# Patient Record
Sex: MALE | Race: WHITE | NOT HISPANIC OR LATINO | URBAN - METROPOLITAN AREA
[De-identification: names, ages, dates, MRNs, and addresses within clinical notes are randomized per-mention and may not be internally consistent; named-entity substitution may affect disease eponyms.]

---

## 2018-10-04 ENCOUNTER — EMERGENCY (EMERGENCY)
Facility: HOSPITAL | Age: 33
LOS: 0 days | Discharge: HOME | End: 2018-10-04
Admitting: EMERGENCY MEDICINE

## 2018-10-04 VITALS
RESPIRATION RATE: 16 BRPM | SYSTOLIC BLOOD PRESSURE: 160 MMHG | HEART RATE: 80 BPM | TEMPERATURE: 98 F | OXYGEN SATURATION: 100 % | DIASTOLIC BLOOD PRESSURE: 100 MMHG
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
## 2018-10-04 DIAGNOSIS — M25.521 PAIN IN RIGHT ELBOW: ICD-10-CM
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
## 2018-10-04 DIAGNOSIS — S46.911A STRAIN OF UNSPECIFIED MUSCLE, FASCIA AND TENDON AT SHOULDER AND UPPER ARM LEVEL, RIGHT ARM, INITIAL ENCOUNTER: ICD-10-CM
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
## 2018-10-04 RX ORDER — IBUPROFEN 200 MG
600 TABLET ORAL ONCE
Qty: 0 | Refills: 0 | Status: COMPLETED | OUTPATIENT
Start: 2018-10-04 | End: 2018-10-04

## 2018-10-04 RX ADMIN — Medication 600 MILLIGRAM(S): at 15:10
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
## 2018-10-04 NOTE — ED PROVIDER NOTE - NS ED ROS FT
CONST: No fever, chills or bodyaches  EYES: No pain, redness, drainage or visual changes.  ENT: No ear pain or discharge, nasal discharge or congestion. No sore throat  CARD: No chest pain, palpitations  RESP: No SOB, cough, hemoptysis. No hx of asthma or COPD  MS: +R elbow pain

## 2018-10-04 NOTE — ED PROVIDER NOTE - PHYSICAL EXAMINATION
CONST: Well appearing in NAD  EYES: PERRL, EOMI, Sclera and conjunctiva clear.   ENT: No nasal discharge. TM's clear B/L without drainage. Oropharynx normal appearing, no erythema or exudates. Uvula midline.  NECK: Non-tender, no meningeal signs  CARD: Normal S1 S2; Normal rate and rhythm  RESP: Equal BS B/L, No wheezes, rhonchi or rales. No distress  MS: Normal ROM in R elbow with tenderness over lateral epicondyle, no obvious deformity and no swelling. Full strength in R hand with no sensory deficits.

## 2018-10-04 NOTE — ED PROVIDER NOTE - DISCUSSED CLINICAL AND RADIOLOGICAL FINDINGS WITH, MDM
0715- pt to room 10 with mom. Pt c/o moist cough and SOB for 2 days. Pt agitated and crying.   
Discharge instructions provided.  Pt's mom verbalized the understanding of discharge instructions to follow up with PCP and to return to ER if condition worsens.  Maurizio CALDERÓN is being discharged with the following medication prescriptions Prelone. Pt ambulated out of ER without difficulty.   
ERP at bedside. Pt,s mom agrees with plan of care discussed by ERP. AIDET acknowledged with patient. Medicinal interventions carried out per ERP orders. X-ray performed at bedside. Respiratory at bedside. Gurney in low position, side rail up for pt safety. Call light within reach. Will continue to monitor.  
Med rec updated and complete  Allergies reviewed, per mother  Pts mother reports no antibiotics in the last 30 days.      
patient

## 2020-03-29 ENCOUNTER — TRANSCRIPTION ENCOUNTER (OUTPATIENT)
Age: 35
End: 2020-03-29

## 2020-11-06 ENCOUNTER — TRANSCRIPTION ENCOUNTER (OUTPATIENT)
Age: 35
End: 2020-11-06

## 2021-04-22 ENCOUNTER — TRANSCRIPTION ENCOUNTER (OUTPATIENT)
Age: 36
End: 2021-04-22

## 2022-04-27 ENCOUNTER — EMERGENCY (EMERGENCY)
Facility: HOSPITAL | Age: 37
LOS: 0 days | Discharge: HOME | End: 2022-04-27
Attending: EMERGENCY MEDICINE | Admitting: EMERGENCY MEDICINE
Payer: COMMERCIAL

## 2022-04-27 ENCOUNTER — TRANSCRIPTION ENCOUNTER (OUTPATIENT)
Age: 37
End: 2022-04-27

## 2022-04-27 VITALS
TEMPERATURE: 98 F | OXYGEN SATURATION: 98 % | SYSTOLIC BLOOD PRESSURE: 187 MMHG | DIASTOLIC BLOOD PRESSURE: 92 MMHG | HEART RATE: 71 BPM | WEIGHT: 225.09 LBS | RESPIRATION RATE: 16 BRPM

## 2022-04-27 VITALS
HEART RATE: 83 BPM | DIASTOLIC BLOOD PRESSURE: 90 MMHG | RESPIRATION RATE: 16 BRPM | OXYGEN SATURATION: 99 % | SYSTOLIC BLOOD PRESSURE: 150 MMHG

## 2022-04-27 DIAGNOSIS — R22.1 LOCALIZED SWELLING, MASS AND LUMP, NECK: ICD-10-CM

## 2022-04-27 DIAGNOSIS — E07.89 OTHER SPECIFIED DISORDERS OF THYROID: ICD-10-CM

## 2022-04-27 LAB
ALBUMIN SERPL ELPH-MCNC: 5 G/DL — SIGNIFICANT CHANGE UP (ref 3.5–5.2)
ALP SERPL-CCNC: 94 U/L — SIGNIFICANT CHANGE UP (ref 30–115)
ALT FLD-CCNC: 31 U/L — SIGNIFICANT CHANGE UP (ref 0–41)
ANION GAP SERPL CALC-SCNC: 13 MMOL/L — SIGNIFICANT CHANGE UP (ref 7–14)
AST SERPL-CCNC: 25 U/L — SIGNIFICANT CHANGE UP (ref 0–41)
BASOPHILS # BLD AUTO: 0.05 K/UL — SIGNIFICANT CHANGE UP (ref 0–0.2)
BASOPHILS NFR BLD AUTO: 0.7 % — SIGNIFICANT CHANGE UP (ref 0–1)
BILIRUB SERPL-MCNC: 0.3 MG/DL — SIGNIFICANT CHANGE UP (ref 0.2–1.2)
BUN SERPL-MCNC: 16 MG/DL — SIGNIFICANT CHANGE UP (ref 10–20)
CALCIUM SERPL-MCNC: 9.8 MG/DL — SIGNIFICANT CHANGE UP (ref 8.5–10.1)
CHLORIDE SERPL-SCNC: 102 MMOL/L — SIGNIFICANT CHANGE UP (ref 98–110)
CO2 SERPL-SCNC: 26 MMOL/L — SIGNIFICANT CHANGE UP (ref 17–32)
CREAT SERPL-MCNC: 1 MG/DL — SIGNIFICANT CHANGE UP (ref 0.7–1.5)
EGFR: 98 ML/MIN/1.73M2 — SIGNIFICANT CHANGE UP
EOSINOPHIL # BLD AUTO: 0.13 K/UL — SIGNIFICANT CHANGE UP (ref 0–0.7)
EOSINOPHIL NFR BLD AUTO: 1.7 % — SIGNIFICANT CHANGE UP (ref 0–8)
GLUCOSE SERPL-MCNC: 96 MG/DL — SIGNIFICANT CHANGE UP (ref 70–99)
HCT VFR BLD CALC: 47.3 % — SIGNIFICANT CHANGE UP (ref 42–52)
HGB BLD-MCNC: 15.6 G/DL — SIGNIFICANT CHANGE UP (ref 14–18)
IMM GRANULOCYTES NFR BLD AUTO: 0.3 % — SIGNIFICANT CHANGE UP (ref 0.1–0.3)
LYMPHOCYTES # BLD AUTO: 2.18 K/UL — SIGNIFICANT CHANGE UP (ref 1.2–3.4)
LYMPHOCYTES # BLD AUTO: 28.6 % — SIGNIFICANT CHANGE UP (ref 20.5–51.1)
MCHC RBC-ENTMCNC: 28.8 PG — SIGNIFICANT CHANGE UP (ref 27–31)
MCHC RBC-ENTMCNC: 33 G/DL — SIGNIFICANT CHANGE UP (ref 32–37)
MCV RBC AUTO: 87.4 FL — SIGNIFICANT CHANGE UP (ref 80–94)
MONOCYTES # BLD AUTO: 0.73 K/UL — HIGH (ref 0.1–0.6)
MONOCYTES NFR BLD AUTO: 9.6 % — HIGH (ref 1.7–9.3)
NEUTROPHILS # BLD AUTO: 4.5 K/UL — SIGNIFICANT CHANGE UP (ref 1.4–6.5)
NEUTROPHILS NFR BLD AUTO: 59.1 % — SIGNIFICANT CHANGE UP (ref 42.2–75.2)
NRBC # BLD: 0 /100 WBCS — SIGNIFICANT CHANGE UP (ref 0–0)
PLATELET # BLD AUTO: 245 K/UL — SIGNIFICANT CHANGE UP (ref 130–400)
POTASSIUM SERPL-MCNC: 4.5 MMOL/L — SIGNIFICANT CHANGE UP (ref 3.5–5)
POTASSIUM SERPL-SCNC: 4.5 MMOL/L — SIGNIFICANT CHANGE UP (ref 3.5–5)
PROT SERPL-MCNC: 8.2 G/DL — HIGH (ref 6–8)
RBC # BLD: 5.41 M/UL — SIGNIFICANT CHANGE UP (ref 4.7–6.1)
RBC # FLD: 12.5 % — SIGNIFICANT CHANGE UP (ref 11.5–14.5)
SODIUM SERPL-SCNC: 141 MMOL/L — SIGNIFICANT CHANGE UP (ref 135–146)
WBC # BLD: 7.61 K/UL — SIGNIFICANT CHANGE UP (ref 4.8–10.8)
WBC # FLD AUTO: 7.61 K/UL — SIGNIFICANT CHANGE UP (ref 4.8–10.8)

## 2022-04-27 PROCEDURE — 99285 EMERGENCY DEPT VISIT HI MDM: CPT

## 2022-04-27 PROCEDURE — 71260 CT THORAX DX C+: CPT | Mod: 26,MA

## 2022-04-27 PROCEDURE — 70491 CT SOFT TISSUE NECK W/DYE: CPT | Mod: 26,MA

## 2022-04-27 RX ORDER — SODIUM CHLORIDE 9 MG/ML
1000 INJECTION, SOLUTION INTRAVENOUS ONCE
Refills: 0 | Status: COMPLETED | OUTPATIENT
Start: 2022-04-27 | End: 2022-04-27

## 2022-04-27 RX ADMIN — SODIUM CHLORIDE 1000 MILLILITER(S): 9 INJECTION, SOLUTION INTRAVENOUS at 19:54

## 2022-04-27 NOTE — ED PROVIDER NOTE - CARE PROVIDER_API CALL
Juju Salcedo  INTERNAL MEDICINE  [address] [place] [address]  Phone: [phone]  Fax: [phone]  Follow Up Time: 1-3 Days

## 2022-04-27 NOTE — ED PROVIDER NOTE - OBJECTIVE STATEMENT
37 y.o. male without any PMH presented to the ER c/o Right neck mass since this AM.  Pt denies trauma, fever, chills, sore throat, dysphagia, SOB. No other complaints.

## 2022-04-27 NOTE — ED ADULT NURSE NOTE - IS THE PATIENT ABLE TO BE SCREENED?
Per physician instructions      If you have questions or concerns on any instructions given to you by your provider today or if you need to schedule an appointment, you can reach us at [phone].                 
Yes
Add 16650 Cpt? (Important Note: In 2017 The Use Of 49463 Is Being Tracked By Cms To Determine Future Global Period Reimbursement For Global Periods): no
Detail Level: Detailed

## 2022-04-27 NOTE — ED PROVIDER NOTE - NSFOLLOWUPINSTRUCTIONS_ED_ALL_ED_FT
Thyroid Nodules    WHAT YOU NEED TO KNOW:    Thyroid nodules are growths on your thyroid gland. Your thyroid makes hormones that help control your body temperature, heart rate, and growth. The hormones also control how fast your body uses food for energy. Some nodules are lumps of tissue, and others are filled with fluid.     DISCHARGE INSTRUCTIONS:    Medicines:   •Thyroid medicine is given to bring your thyroid hormone levels back to a normal range.       •Radioactive iodine is given to damage cells in your thyroid gland and decrease the size of your nodules.       •Take your medicine as directed. Contact your healthcare provider if you think your medicine is not helping or if you have side effects. Tell him or her if you are allergic to any medicine. Keep a list of the medicines, vitamins, and herbs you take. Include the amounts, and when and why you take them. Bring the list or the pill bottles to follow-up visits. Carry your medicine list with you in case of an emergency.      Follow up with your healthcare provider as directed: You may need frequent blood tests to check your thyroid hormone levels. You may also need tests such as an ultrasound to check if any nodules are growing or have returned. If you had surgery, follow directions about how to care for your wound. Write down your questions so you remember to ask them during your visits.    Eat iodine-rich foods: Examples include fish, seaweed, dairy products, eggs, beans, and lean meat. Iodized salt also contains iodine. You may need to use iodized table salt when you cook and season your food. Iodine may be added to bread or to your drinking water. Ask for a list of foods that contain iodine, and ask how much iodine you need each day.    Contact your healthcare provider if:   •You have a new cough that does not improve.      •You begin choking or have new or increased trouble swallowing.       •Your voice becomes hoarse.      •You are losing weight without trying.      •You have questions or concerns about your condition or care.      Seek immediate care or call 911 if:   •You have redness, swelling, or drainage at your surgery site.      •You have sudden chest pain or trouble breathing.      •Your symptoms worsen, even after you take your medicines.         © Copyright [employer] 2022           back to top                          © Copyright [employer] 2022

## 2022-04-27 NOTE — ED PROVIDER NOTE - PATIENT PORTAL LINK FT
You can access the FollowMyHealth Patient Portal offered by [facility] by registering at the following website: [email or web address]. By joining [facility]’s FollowMyHealth portal, you will also be able to view your health information using other applications (apps) compatible with our system.

## 2022-04-27 NOTE — ED PROVIDER NOTE - NS ED ATTENDING STATEMENT MOD
This was a shared visit with the CORINNE. I reviewed and verified the documentation and independently performed the documented:

## 2022-04-27 NOTE — ED PROVIDER NOTE - CLINICAL SUMMARY MEDICAL DECISION MAKING FREE TEXT BOX
37-year-old male presents with right neck mass and found to have mass in right thyroid lobe without airway compromise.   Labs reassuring.  Imaging concerning for mass in the right lobe of the thyroid, which appears to correlate with the bulge of patient's right neck.  Results discussed with the patient along with recommendation for urgent endocrinology follow-up for likely biopsy and further care.  Patient amenable to outpatient follow-up at this time.  Okay to DC with supportive care return precautions.

## 2022-04-27 NOTE — ED PROVIDER NOTE - ENMT, MLM
Airway patent, Nasal mucosa clear. Mouth with normal mucosa. Throat has no vesicles, no oropharyngeal exudates and uvula is midline. (+) Large Soft Right sided thyroid mass, no lymphadenopathy

## 2022-05-31 ENCOUNTER — RESULT REVIEW (OUTPATIENT)
Age: 37
End: 2022-05-31

## 2022-07-05 PROBLEM — Z00.00 ENCOUNTER FOR PREVENTIVE HEALTH EXAMINATION: Status: ACTIVE | Noted: 2022-07-05

## 2022-07-06 ENCOUNTER — APPOINTMENT (OUTPATIENT)
Dept: OTOLARYNGOLOGY | Facility: CLINIC | Age: 37
End: 2022-07-06

## 2022-07-06 PROCEDURE — 99243 OFF/OP CNSLTJ NEW/EST LOW 30: CPT

## 2022-07-06 NOTE — PHYSICAL EXAM
[de-identified] : 5cm soft right thyroid nodule mobile on deglutition [Midline] : trachea located in midline position [Normal] : no rashes

## 2022-07-06 NOTE — REASON FOR VISIT
[Initial Consultation] : an initial consultation for [FreeTextEntry2] : Referred by Dr Etienne with large right thyroid nodule

## 2022-07-06 NOTE — ASSESSMENT
[FreeTextEntry1] : I have discussed the clinical implications of my findings with  the patient. At this time the patient wishes to proceed with surgery- right cari-  thyroidectomy  .  The goals of the procedure, operative plan, alternatives including nonsurgical treatment , total thyroidectomy,  and risks which include but are not limited to  anesthetic risk, bleeding, infection,  aesthetic deformity, numbness of skin,  chronic swallowing and voice problems, recurrent laryngeal nerve injury, superior laryngeal nerve injury, need for further surgery, need for further treatment, permanent hypoparathyroidism, need for thyroid homrnone , chyle fistula were all explained to the patient who appears to understand  and wishes to proceed.    All questions are answered.  Accordingly they will be scheduled for right cair- thyroidectomy The patient will follow up with me sooner PRN any new, persistent or worsening symptoms\par aprox 45 mi  spent counselimg and coordinating care

## 2022-07-06 NOTE — HISTORY OF PRESENT ILLNESS
[de-identified] : presents at request of Dr. Etiennewith complaints of  large 5cm right  thyroid nodule for > 6 months  .\par He TUS guided fna  \par Denies FH thyroid cancer, RT exposure, hoarseness but + mild dysphagia\par \par Barbara Dill's

## 2022-08-02 ENCOUNTER — OUTPATIENT (OUTPATIENT)
Dept: OUTPATIENT SERVICES | Facility: HOSPITAL | Age: 37
LOS: 1 days | Discharge: HOME | End: 2022-08-02

## 2022-08-02 ENCOUNTER — RESULT REVIEW (OUTPATIENT)
Age: 37
End: 2022-08-02

## 2022-08-02 VITALS
HEIGHT: 73 IN | RESPIRATION RATE: 18 BRPM | HEART RATE: 80 BPM | DIASTOLIC BLOOD PRESSURE: 84 MMHG | SYSTOLIC BLOOD PRESSURE: 147 MMHG | TEMPERATURE: 99 F | WEIGHT: 233.69 LBS | OXYGEN SATURATION: 100 %

## 2022-08-02 DIAGNOSIS — E04.1 NONTOXIC SINGLE THYROID NODULE: ICD-10-CM

## 2022-08-02 DIAGNOSIS — Z01.818 ENCOUNTER FOR OTHER PREPROCEDURAL EXAMINATION: ICD-10-CM

## 2022-08-02 DIAGNOSIS — Z78.9 OTHER SPECIFIED HEALTH STATUS: Chronic | ICD-10-CM

## 2022-08-02 LAB
ALBUMIN SERPL ELPH-MCNC: 5 G/DL — SIGNIFICANT CHANGE UP (ref 3.5–5.2)
ALP SERPL-CCNC: 97 U/L — SIGNIFICANT CHANGE UP (ref 30–115)
ALT FLD-CCNC: 32 U/L — SIGNIFICANT CHANGE UP (ref 0–41)
ANION GAP SERPL CALC-SCNC: 11 MMOL/L — SIGNIFICANT CHANGE UP (ref 7–14)
APTT BLD: 34.8 SEC — SIGNIFICANT CHANGE UP (ref 27–39.2)
AST SERPL-CCNC: 24 U/L — SIGNIFICANT CHANGE UP (ref 0–41)
BASOPHILS # BLD AUTO: 0.04 K/UL — SIGNIFICANT CHANGE UP (ref 0–0.2)
BASOPHILS NFR BLD AUTO: 0.6 % — SIGNIFICANT CHANGE UP (ref 0–1)
BILIRUB SERPL-MCNC: 0.3 MG/DL — SIGNIFICANT CHANGE UP (ref 0.2–1.2)
BLD GP AB SCN SERPL QL: SIGNIFICANT CHANGE UP
BUN SERPL-MCNC: 17 MG/DL — SIGNIFICANT CHANGE UP (ref 10–20)
CALCIUM SERPL-MCNC: 9.4 MG/DL — SIGNIFICANT CHANGE UP (ref 8.5–10.1)
CHLORIDE SERPL-SCNC: 99 MMOL/L — SIGNIFICANT CHANGE UP (ref 98–110)
CO2 SERPL-SCNC: 26 MMOL/L — SIGNIFICANT CHANGE UP (ref 17–32)
CREAT SERPL-MCNC: 1.2 MG/DL — SIGNIFICANT CHANGE UP (ref 0.7–1.5)
EGFR: 79 ML/MIN/1.73M2 — SIGNIFICANT CHANGE UP
EOSINOPHIL # BLD AUTO: 0.07 K/UL — SIGNIFICANT CHANGE UP (ref 0–0.7)
EOSINOPHIL NFR BLD AUTO: 1 % — SIGNIFICANT CHANGE UP (ref 0–8)
GLUCOSE SERPL-MCNC: 95 MG/DL — SIGNIFICANT CHANGE UP (ref 70–99)
HCT VFR BLD CALC: 46.8 % — SIGNIFICANT CHANGE UP (ref 42–52)
HGB BLD-MCNC: 15.7 G/DL — SIGNIFICANT CHANGE UP (ref 14–18)
IMM GRANULOCYTES NFR BLD AUTO: 0.3 % — SIGNIFICANT CHANGE UP (ref 0.1–0.3)
INR BLD: 1.06 RATIO — SIGNIFICANT CHANGE UP (ref 0.65–1.3)
LYMPHOCYTES # BLD AUTO: 1.97 K/UL — SIGNIFICANT CHANGE UP (ref 1.2–3.4)
LYMPHOCYTES # BLD AUTO: 28.4 % — SIGNIFICANT CHANGE UP (ref 20.5–51.1)
MCHC RBC-ENTMCNC: 29.7 PG — SIGNIFICANT CHANGE UP (ref 27–31)
MCHC RBC-ENTMCNC: 33.5 G/DL — SIGNIFICANT CHANGE UP (ref 32–37)
MCV RBC AUTO: 88.5 FL — SIGNIFICANT CHANGE UP (ref 80–94)
MONOCYTES # BLD AUTO: 0.64 K/UL — HIGH (ref 0.1–0.6)
MONOCYTES NFR BLD AUTO: 9.2 % — SIGNIFICANT CHANGE UP (ref 1.7–9.3)
NEUTROPHILS # BLD AUTO: 4.19 K/UL — SIGNIFICANT CHANGE UP (ref 1.4–6.5)
NEUTROPHILS NFR BLD AUTO: 60.5 % — SIGNIFICANT CHANGE UP (ref 42.2–75.2)
NRBC # BLD: 0 /100 WBCS — SIGNIFICANT CHANGE UP (ref 0–0)
PLATELET # BLD AUTO: 223 K/UL — SIGNIFICANT CHANGE UP (ref 130–400)
POTASSIUM SERPL-MCNC: 3.9 MMOL/L — SIGNIFICANT CHANGE UP (ref 3.5–5)
POTASSIUM SERPL-SCNC: 3.9 MMOL/L — SIGNIFICANT CHANGE UP (ref 3.5–5)
PROT SERPL-MCNC: 7.6 G/DL — SIGNIFICANT CHANGE UP (ref 6–8)
PROTHROM AB SERPL-ACNC: 12.2 SEC — SIGNIFICANT CHANGE UP (ref 9.95–12.87)
RBC # BLD: 5.29 M/UL — SIGNIFICANT CHANGE UP (ref 4.7–6.1)
RBC # FLD: 12.6 % — SIGNIFICANT CHANGE UP (ref 11.5–14.5)
SODIUM SERPL-SCNC: 136 MMOL/L — SIGNIFICANT CHANGE UP (ref 135–146)
WBC # BLD: 6.93 K/UL — SIGNIFICANT CHANGE UP (ref 4.8–10.8)
WBC # FLD AUTO: 6.93 K/UL — SIGNIFICANT CHANGE UP (ref 4.8–10.8)

## 2022-08-02 PROCEDURE — 71046 X-RAY EXAM CHEST 2 VIEWS: CPT | Mod: 26

## 2022-08-02 PROCEDURE — 93010 ELECTROCARDIOGRAM REPORT: CPT

## 2022-08-02 NOTE — H&P PST ADULT - HISTORY OF PRESENT ILLNESS
Patient is a  37 year old male presenting to PAST in preparation for RIGHT THYROID LOBECTOMY on 8/23  under gen anesthesia by Dr. Santamaria  Reports he presented to urgent care after he noted that the right side of his neck was enlarged, a sonogram was done and it was determined that he had a large mass  aprox 5cm. He was sent to Ed and was advised to f/u with endocrinologist. He saw dr Salcedo who advised him to have above  Reports that the mass is benign  PATIENT CURRENTLY DENIES CHEST PAIN  SHORTNESS OF BREATH  PALPITATIONS,  DYSURIA, OR UPPER RESPIRATORY INFECTION IN PAST 2 WEEKS  EXERCISE  TOLERANCE  1-2 FLIGHT OF STAIRS  WITHOUT SHORTNESS OF BREATH    Anesthesia Alert  NO--Difficult Airway  NO--History of neck surgery or radiation  NO--Limited ROM of neck  NO--History of Malignant hyperthermia  NO--Personal or family history of Pseudocholinesterase deficiency  NO--Prior Anesthesia Complication  NO--Latex Allergy  NO--Loose teeth  NO--History of Rheumatoid Arthritis  NO--TIAGO  NO-- BLEEDING RISK  NO--Other_____    As per patient, this is their complete medical and surgical history, including medications both prescribed or over the counter.  Patient verbalized understanding of instructions and was given the opportunity to ask questions and have them answered.  Patient denies any signs or symptoms of COVID 19 and denies contact with known positive individuals.  They have an appointment for repeat COVID testing pre-procedure and acknowledge its time and place.  They were instructed to quarantine pre-procedure, practice exposure control measures, continue to self-monitor and report any concerns to their proceduralist.

## 2022-08-20 ENCOUNTER — LABORATORY RESULT (OUTPATIENT)
Age: 37
End: 2022-08-20

## 2022-08-22 NOTE — ASU PATIENT PROFILE, ADULT - FALL HARM RISK - RISK INTERVENTIONS

## 2022-08-22 NOTE — ASU PATIENT PROFILE, ADULT - NSICDXPASTMEDICALHX_GEN_ALL_CORE_FT
PAST MEDICAL HISTORY:  GERD (gastroesophageal reflux disease)     Thyroid mass      Kaley Thurman)  2017 13:38:12

## 2022-08-23 ENCOUNTER — RESULT REVIEW (OUTPATIENT)
Age: 37
End: 2022-08-23

## 2022-08-23 ENCOUNTER — OUTPATIENT (OUTPATIENT)
Dept: OUTPATIENT SERVICES | Facility: HOSPITAL | Age: 37
LOS: 1 days | Discharge: HOME | End: 2022-08-23

## 2022-08-23 ENCOUNTER — APPOINTMENT (OUTPATIENT)
Dept: OTOLARYNGOLOGY | Facility: HOSPITAL | Age: 37
End: 2022-08-23

## 2022-08-23 ENCOUNTER — TRANSCRIPTION ENCOUNTER (OUTPATIENT)
Age: 37
End: 2022-08-23

## 2022-08-23 VITALS
WEIGHT: 225.09 LBS | SYSTOLIC BLOOD PRESSURE: 156 MMHG | RESPIRATION RATE: 15 BRPM | HEART RATE: 72 BPM | OXYGEN SATURATION: 99 % | TEMPERATURE: 98 F | HEIGHT: 73 IN | DIASTOLIC BLOOD PRESSURE: 92 MMHG

## 2022-08-23 VITALS
DIASTOLIC BLOOD PRESSURE: 79 MMHG | HEART RATE: 56 BPM | TEMPERATURE: 98 F | OXYGEN SATURATION: 99 % | SYSTOLIC BLOOD PRESSURE: 133 MMHG

## 2022-08-23 DIAGNOSIS — Z78.9 OTHER SPECIFIED HEALTH STATUS: Chronic | ICD-10-CM

## 2022-08-23 LAB — ABO RH CONFIRMATION: SIGNIFICANT CHANGE UP

## 2022-08-23 PROCEDURE — 88307 TISSUE EXAM BY PATHOLOGIST: CPT | Mod: 26

## 2022-08-23 PROCEDURE — 60220 PARTIAL REMOVAL OF THYROID: CPT

## 2022-08-23 RX ORDER — ACETAMINOPHEN 500 MG
1000 TABLET ORAL ONCE
Refills: 0 | Status: DISCONTINUED | OUTPATIENT
Start: 2022-08-23 | End: 2022-08-23

## 2022-08-23 RX ORDER — OXYCODONE HYDROCHLORIDE 5 MG/1
5 TABLET ORAL ONCE
Refills: 0 | Status: DISCONTINUED | OUTPATIENT
Start: 2022-08-23 | End: 2022-08-24

## 2022-08-23 RX ORDER — HYDROMORPHONE HYDROCHLORIDE 2 MG/ML
0.2 INJECTION INTRAMUSCULAR; INTRAVENOUS; SUBCUTANEOUS
Refills: 0 | Status: DISCONTINUED | OUTPATIENT
Start: 2022-08-23 | End: 2022-08-24

## 2022-08-23 RX ORDER — OXYCODONE HYDROCHLORIDE 5 MG/1
10 TABLET ORAL ONCE
Refills: 0 | Status: DISCONTINUED | OUTPATIENT
Start: 2022-08-23 | End: 2022-08-24

## 2022-08-23 RX ORDER — ACETYLCYSTEINE 200 MG/ML
15 VIAL (ML) MISCELLANEOUS ONCE
Refills: 0 | Status: DISCONTINUED | OUTPATIENT
Start: 2022-08-23 | End: 2022-08-23

## 2022-08-23 RX ORDER — HYDROMORPHONE HYDROCHLORIDE 2 MG/ML
0.5 INJECTION INTRAMUSCULAR; INTRAVENOUS; SUBCUTANEOUS
Refills: 0 | Status: DISCONTINUED | OUTPATIENT
Start: 2022-08-23 | End: 2022-08-24

## 2022-08-23 RX ORDER — ACETAMINOPHEN WITH CODEINE 300MG-30MG
1 TABLET ORAL
Qty: 20 | Refills: 0
Start: 2022-08-23 | End: 2022-08-27

## 2022-08-23 NOTE — ASU DISCHARGE PLAN (ADULT/PEDIATRIC) - NS MD DC FALL RISK RISK
For information on Fall & Injury Prevention, visit: [email or web address] OR  [email or web address] OR  https://www.cdc.gov/steadi/patient.html

## 2022-08-23 NOTE — PRE-ANESTHESIA EVALUATION ADULT - NSANTHOSAYNRD_GEN_A_CORE
denies/No. TIAGO screening performed.  STOP BANG Legend: 0-2 = LOW Risk; 3-4 = INTERMEDIATE Risk; 5-8 = HIGH Risk

## 2022-08-23 NOTE — BRIEF OPERATIVE NOTE - CONDITION POST OP
Patient called the endocrine department,New patient appointment on 5/31/17 with Dr. Lr.  The patient c/o lightheadedness x 1 week,bilateral hand numbness x 3 days,vision becomes \"patchy\" when turning head fast and fatigue.  Dr. Price referred patient to the endocrine department.  Writer transferred patient to Dr. Price office regarding several symptoms then suggested she call her pcp.  
stable

## 2022-08-23 NOTE — ASU DISCHARGE PLAN (ADULT/PEDIATRIC) - ASU DC SPECIAL INSTRUCTIONSFT
PAIN: You may take Tylenol 650mg every 6-8 hours as needed for mild to moderate pain. You may take Tylenol #3 every 6-8 hours as needed for severe pain. Do not take this medication and drive or make important decisions.  DRESSING: Keep the dressing clean and dry for 24 hours post-operatively. You may shower normally after that. Do not take the steri strips off, they will fall off by themselves. No soaking (ocean, pool, bath, etc.) until cleared by Dr. Santamaria.  FOLLOW UP: In two weeks; call to schedule an appointment.

## 2022-08-23 NOTE — ASU DISCHARGE PLAN (ADULT/PEDIATRIC) - CARE PROVIDER_API CALL
Denis Santamaria)  Otolaryngology  [address] [place] [address], [address] [place] [address]  Phone: [phone]  Fax: [phone]  Follow Up Time: 2 weeks

## 2022-08-23 NOTE — CHART NOTE - NSCHARTNOTEFT_GEN_A_CORE
PACU ANESTHESIA ADMISSION NOTE      Procedure: Right thyroid lobectomy      Post op diagnosis:  Right thyroid nodule        ____  Intubated  TV:______       Rate: ______      FiO2: ______    _x__  Patent Airway    _x___  Full return of protective reflexes    _x___  Full recovery from anesthesia / back to baseline     Vitals:   T:    97.6       R:        12          BP:       128/73           Sat:      96             P: 85      Mental Status:  __x__ Awake   _____ Alert   _____ Drowsy   _____ Sedated    Nausea/Vomiting:  ____x NO  ______Yes,   See Post - Op Orders          Pain Scale (0-10):  _____    Treatment: ____ None    ___x_ See Post - Op/PCA Orders    Post - Operative Fluids:   ____ Oral   __x__ See Post - Op Orders    Plan: Discharge:   __x__Home       _____Floor     _____Critical Care    _____  Other:_________________    Comments: Pt alert and oriented, procedure completed without anesthetic complication, no c/o pain/discomfort at this time.

## 2022-08-25 DIAGNOSIS — E04.1 NONTOXIC SINGLE THYROID NODULE: ICD-10-CM

## 2022-08-26 LAB — SURGICAL PATHOLOGY STUDY: SIGNIFICANT CHANGE UP

## 2022-08-30 DIAGNOSIS — D49.7 NEOPLASM OF UNSPECIFIED BEHAVIOR OF ENDOCRINE GLANDS AND OTHER PARTS OF NERVOUS SYSTEM: ICD-10-CM

## 2022-09-07 ENCOUNTER — APPOINTMENT (OUTPATIENT)
Dept: OTOLARYNGOLOGY | Facility: CLINIC | Age: 37
End: 2022-09-07

## 2022-09-07 PROCEDURE — 99024 POSTOP FOLLOW-UP VISIT: CPT

## 2022-09-07 NOTE — REASON FOR VISIT
[Subsequent Evaluation] : a subsequent evaluation for [FreeTextEntry2] : s/p hemithyroidectomy path NIFTP

## 2022-09-07 NOTE — ASSESSMENT
[FreeTextEntry1] : s/p hemithyroidectomy path NIFTP\par options d/w patiemnt \par will observe\par f/u w Dr Etienne/endo

## 2022-09-07 NOTE — PHYSICAL EXAM
[de-identified] : incison c/d/i suture removed [Midline] : trachea located in midline position [Normal] : no rashes

## 2022-09-08 ENCOUNTER — TRANSCRIPTION ENCOUNTER (OUTPATIENT)
Age: 37
End: 2022-09-08

## 2023-04-07 NOTE — ED PROVIDER NOTE - ATTENDING APP SHARED VISIT CONTRIBUTION OF CARE
37-year-old male otherwise healthy presents with newly identified right neck mass without airway compromise.  Patient hemodynamically stable and otherwise well-appearing. Lip Wedge Excision Repair Text: Given the location of the defect and the proximity to free margins a full thickness wedge repair was deemed most appropriate.  Using a sterile surgical marker, the appropriate repair was drawn incorporating the defect and placing the expected incisions perpendicular to the vermilion border.  The vermilion border was also meticulously outlined to ensure appropriate reapproximation during the repair.  The area thus outlined was incised through and through with a #15 scalpel blade.  The muscularis and dermis were reaproximated with deep sutures following hemostasis. Care was taken to realign the vermilion border before proceeding with the superficial closure.  Once the vermilion was realigned the superfical and mucosal closure was finished.

## 2023-06-06 PROBLEM — E07.9 DISORDER OF THYROID, UNSPECIFIED: Chronic | Status: ACTIVE | Noted: 2022-08-02

## 2023-06-06 PROBLEM — K21.9 GASTRO-ESOPHAGEAL REFLUX DISEASE WITHOUT ESOPHAGITIS: Chronic | Status: ACTIVE | Noted: 2022-08-02

## 2023-07-21 ENCOUNTER — APPOINTMENT (OUTPATIENT)
Dept: ENDOCRINOLOGY | Facility: CLINIC | Age: 38
End: 2023-07-21
Payer: COMMERCIAL

## 2023-07-21 VITALS
DIASTOLIC BLOOD PRESSURE: 88 MMHG | WEIGHT: 225 LBS | HEART RATE: 75 BPM | BODY MASS INDEX: 29.82 KG/M2 | HEIGHT: 73 IN | OXYGEN SATURATION: 98 % | TEMPERATURE: 97.4 F | SYSTOLIC BLOOD PRESSURE: 128 MMHG

## 2023-07-21 DIAGNOSIS — Z80.42 FAMILY HISTORY OF MALIGNANT NEOPLASM OF PROSTATE: ICD-10-CM

## 2023-07-21 PROCEDURE — 99203 OFFICE O/P NEW LOW 30 MIN: CPT

## 2023-07-21 NOTE — HISTORY OF PRESENT ILLNESS
[FreeTextEntry1] : 38 year old male patient who present for evaluation of subclinical hypothyroidism , he is s/p right thyroidectomy for NIFTP \par \par \par \par # thyroid history \par - in 5/2022 he was noted to have right side mass, had evaluation done followed by right thyroidectomy and isthmectomy \par - Pathology ( 8/2023 ) showed NIFTP , patient did not follow up with endocrinology since\par - he feels ok , energy level ok , no constipation, no bloating, cold intolerance he is not on any medications\par

## 2023-07-21 NOTE — PHYSICAL EXAM
[Alert] : alert [Well Nourished] : well nourished [No Acute Distress] : no acute distress [No Proptosis] : no proptosis [No Lid Lag] : no lid lag [Well Healed Scar] : well healed scar [No Respiratory Distress] : no respiratory distress [No Accessory Muscle Use] : no accessory muscle use [Clear to Auscultation] : lungs were clear to auscultation bilaterally [Normal S1, S2] : normal S1 and S2 [No Murmurs] : no murmurs [Regular Rhythm] : with a regular rhythm [No Edema] : no peripheral edema [No Stigmata of Cushings Syndrome] : no stigmata of Cushings Syndrome [Oriented x3] : oriented to person, place, and time [de-identified] : left thyroid tissue

## 2023-07-21 NOTE — ASSESSMENT
[FreeTextEntry1] : 38 year old male patient who present for evaluation of subclinical hypothyroidism , he is s/p right thyroidectomy for NIFTP ( 8/2022) \par \par \par \par #Subclinical hypothyroidism , DL, preDM \par - in 5/2022 he was noted to have right side mass, had evaluation done followed by right thyroidectomy and isthmectomy \par - Pathology ( 8/2023 ) showed NIFTP , patient did not follow up with endocrinology since\par - he feels ok , energy level ok , no constipation, no bloating, cold intolerance he is not on any medications\par - holding off on meds for now , will recheck levels if hypothyroidism / worsening LDL will start Lt4 \par - discussed low carbs and low fat diet

## 2023-07-21 NOTE — DATA REVIEWED
[FreeTextEntry1] : 5/27/23 : A1c 5.9%  ldl 155 tg 147 tsh 5.94  ft4 1 GFR 78  \par \par \par \par \par Pathology ( 8/2022)\par right thyroid lobe pathology ( 8/2022) , focally invade capsule but doesn't go through capsule all margins negative \par NIFTP  4 x 3.5x 2.5 cm

## 2023-11-15 ENCOUNTER — APPOINTMENT (OUTPATIENT)
Dept: ENDOCRINOLOGY | Facility: CLINIC | Age: 38
End: 2023-11-15
Payer: COMMERCIAL

## 2023-11-15 VITALS
HEART RATE: 65 BPM | SYSTOLIC BLOOD PRESSURE: 120 MMHG | DIASTOLIC BLOOD PRESSURE: 80 MMHG | OXYGEN SATURATION: 98 % | BODY MASS INDEX: 29.42 KG/M2 | WEIGHT: 222 LBS | HEIGHT: 73 IN

## 2023-11-15 DIAGNOSIS — R73.03 PREDIABETES.: ICD-10-CM

## 2023-11-15 PROCEDURE — 99212 OFFICE O/P EST SF 10 MIN: CPT

## 2023-12-06 ENCOUNTER — APPOINTMENT (OUTPATIENT)
Dept: UROLOGY | Facility: CLINIC | Age: 38
End: 2023-12-06
Payer: COMMERCIAL

## 2023-12-06 DIAGNOSIS — Z12.5 ENCOUNTER FOR SCREENING FOR MALIGNANT NEOPLASM OF PROSTATE: ICD-10-CM

## 2023-12-06 PROCEDURE — 99203 OFFICE O/P NEW LOW 30 MIN: CPT

## 2024-01-09 ENCOUNTER — OUTPATIENT (OUTPATIENT)
Dept: OUTPATIENT SERVICES | Facility: HOSPITAL | Age: 39
LOS: 1 days | End: 2024-01-09
Payer: COMMERCIAL

## 2024-01-09 ENCOUNTER — RESULT REVIEW (OUTPATIENT)
Age: 39
End: 2024-01-09

## 2024-01-09 DIAGNOSIS — Z00.8 ENCOUNTER FOR OTHER GENERAL EXAMINATION: ICD-10-CM

## 2024-01-09 DIAGNOSIS — Z12.5 ENCOUNTER FOR SCREENING FOR MALIGNANT NEOPLASM OF PROSTATE: ICD-10-CM

## 2024-01-09 DIAGNOSIS — Z78.9 OTHER SPECIFIED HEALTH STATUS: Chronic | ICD-10-CM

## 2024-01-09 PROCEDURE — 76770 US EXAM ABDO BACK WALL COMP: CPT

## 2024-01-09 PROCEDURE — 76770 US EXAM ABDO BACK WALL COMP: CPT | Mod: 26

## 2024-01-10 DIAGNOSIS — Z12.5 ENCOUNTER FOR SCREENING FOR MALIGNANT NEOPLASM OF PROSTATE: ICD-10-CM

## 2024-01-11 ENCOUNTER — APPOINTMENT (OUTPATIENT)
Dept: UROLOGY | Facility: CLINIC | Age: 39
End: 2024-01-11
Payer: COMMERCIAL

## 2024-01-11 VITALS
BODY MASS INDEX: 29.16 KG/M2 | TEMPERATURE: 97.8 F | SYSTOLIC BLOOD PRESSURE: 124 MMHG | DIASTOLIC BLOOD PRESSURE: 84 MMHG | HEIGHT: 73 IN | WEIGHT: 220 LBS

## 2024-01-11 PROCEDURE — 99214 OFFICE O/P EST MOD 30 MIN: CPT

## 2024-01-14 NOTE — LETTER BODY
[Dear  ___] : Dear  [unfilled], [Consult Letter:] : I had the pleasure of evaluating your patient, [unfilled]. [Please see my note below.] : Please see my note below. [Sincerely,] : Sincerely, [FreeTextEntry3] : Abhinav Carrillo MD, FACS

## 2024-01-14 NOTE — PHYSICAL EXAM
[Normal Appearance] : normal appearance [Well Groomed] : well groomed [General Appearance - In No Acute Distress] : no acute distress [Edema] : no peripheral edema [Respiration, Rhythm And Depth] : normal respiratory rhythm and effort [Exaggerated Use Of Accessory Muscles For Inspiration] : no accessory muscle use [Abdomen Tenderness] : non-tender [Abdomen Soft] : soft [Costovertebral Angle Tenderness] : no ~M costovertebral angle tenderness [No Prostate Nodules] : no prostate nodules [Urinary Bladder Findings] : the bladder was normal on palpation [Normal Station and Gait] : the gait and station were normal for the patient's age [] : no rash [No Focal Deficits] : no focal deficits [Affect] : the affect was normal [Oriented To Time, Place, And Person] : oriented to person, place, and time [Mood] : the mood was normal [de-identified] : smooth prostate - non tender [No Palpable Adenopathy] : no palpable adenopathy

## 2024-01-14 NOTE — HISTORY OF PRESENT ILLNESS
[FreeTextEntry1] : 37 yo with strong family history of prostate cancer father and fathers brother had  from prostate cancer maternal breast / ovarian or maternal uncle with prostate cancer - unkonwn  last seen 2023 here to review his PSA and imaging  he is on medication for hypothyroidism - and has noted bothersome LUTS  IPSS - 5 (frequency) IIEF - 25  23 PSA 0.3 / 33%  Renal and Bladder US  2024 FINDINGS:  Right kidney: 11.8 cm in length. No hydronephrosis or calculi. Left kidney:  11.9 cm in length. No hydronephrosis or calculi. Urinary bladder: No debris or calculus. Bilateral ureteral jets are visualized. Prevoid volume of approximately 125 cc.  Postvoid volume is approximately 73 cc. Prostate gland mildly nodular measuring 4.4 x 3.8 x 3.8 cm. This corresponds to a volume of 32.3 g.  IMPRESSION: Kidneys within normal limits Minimal urinary bladder retention.

## 2024-01-14 NOTE — ASSESSMENT
[FreeTextEntry1] : 37 yo with strong family history of prostate cancer PSA was reviewed DEEP was reviewed  discussed his urinary frequency issue and its possible association with thyroid medication nevertheless I will obtain a renal and bladder US will obtain urine studies   he will f/u to review and plan further care once US is completed discussed tadalafil daily - explained the R+B of the medication awaiting the US to determine if he would benefit for tadalafil all questions answered

## 2024-02-07 RX ORDER — LEVOTHYROXINE SODIUM 0.05 MG/1
50 TABLET ORAL
Qty: 90 | Refills: 1 | Status: ACTIVE | COMMUNITY
Start: 2023-11-15 | End: 1900-01-01

## 2024-06-26 ENCOUNTER — APPOINTMENT (OUTPATIENT)
Dept: ENDOCRINOLOGY | Facility: CLINIC | Age: 39
End: 2024-06-26
Payer: COMMERCIAL

## 2024-06-26 VITALS
OXYGEN SATURATION: 98 % | HEART RATE: 68 BPM | WEIGHT: 230 LBS | BODY MASS INDEX: 30.48 KG/M2 | HEIGHT: 73 IN | SYSTOLIC BLOOD PRESSURE: 125 MMHG | DIASTOLIC BLOOD PRESSURE: 80 MMHG

## 2024-06-26 DIAGNOSIS — E78.5 HYPERLIPIDEMIA, UNSPECIFIED: ICD-10-CM

## 2024-06-26 DIAGNOSIS — E03.8 OTHER SPECIFIED HYPOTHYROIDISM: ICD-10-CM

## 2024-06-26 PROCEDURE — 99213 OFFICE O/P EST LOW 20 MIN: CPT

## 2024-06-26 RX ORDER — LEVOTHYROXINE SODIUM 0.07 MG/1
75 TABLET ORAL DAILY
Qty: 90 | Refills: 1 | Status: ACTIVE | COMMUNITY
Start: 2024-06-26 | End: 1900-01-01

## 2024-09-07 ENCOUNTER — EMERGENCY (EMERGENCY)
Facility: HOSPITAL | Age: 39
LOS: 0 days | Discharge: HOME | End: 2024-09-07
Attending: STUDENT IN AN ORGANIZED HEALTH CARE EDUCATION/TRAINING PROGRAM
Payer: COMMERCIAL

## 2024-09-07 VITALS
WEIGHT: 225.09 LBS | DIASTOLIC BLOOD PRESSURE: 109 MMHG | RESPIRATION RATE: 17 BRPM | SYSTOLIC BLOOD PRESSURE: 150 MMHG | TEMPERATURE: 98 F | HEART RATE: 108 BPM

## 2024-09-07 DIAGNOSIS — M25.512 PAIN IN LEFT SHOULDER: ICD-10-CM
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
## 2024-09-07 DIAGNOSIS — Z78.9 OTHER SPECIFIED HEALTH STATUS: Chronic | ICD-10-CM

## 2024-09-07 DIAGNOSIS — S46.912A STRAIN OF UNSPECIFIED MUSCLE, FASCIA AND TENDON AT SHOULDER AND UPPER ARM LEVEL, LEFT ARM, INITIAL ENCOUNTER: ICD-10-CM
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
## 2024-09-07 DIAGNOSIS — E03.9 HYPOTHYROIDISM, UNSPECIFIED: ICD-10-CM
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
## 2024-09-07 DIAGNOSIS — S66.911A STRAIN OF UNSPECIFIED MUSCLE, FASCIA AND TENDON AT WRIST AND HAND LEVEL, RIGHT HAND, INITIAL ENCOUNTER: ICD-10-CM

## 2024-09-07 DIAGNOSIS — M25.531 PAIN IN RIGHT WRIST: ICD-10-CM
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
## 2024-09-07 PROCEDURE — 73110 X-RAY EXAM OF WRIST: CPT | Mod: 26,RT

## 2024-09-07 PROCEDURE — 73030 X-RAY EXAM OF SHOULDER: CPT | Mod: LT

## 2024-09-07 PROCEDURE — 73030 X-RAY EXAM OF SHOULDER: CPT | Mod: 26,LT

## 2024-09-07 PROCEDURE — 73110 X-RAY EXAM OF WRIST: CPT | Mod: RT

## 2024-09-07 PROCEDURE — 99284 EMERGENCY DEPT VISIT MOD MDM: CPT | Mod: 25

## 2024-09-07 PROCEDURE — 99284 EMERGENCY DEPT VISIT MOD MDM: CPT

## 2024-09-07 RX ORDER — IBUPROFEN 200 MG
600 TABLET ORAL ONCE
Refills: 0 | Status: COMPLETED | OUTPATIENT
Start: 2024-09-07 | End: 2024-09-07

## 2024-09-07 RX ADMIN — Medication 600 MILLIGRAM(S): at 17:49

## 2024-12-13 ENCOUNTER — RX RENEWAL (OUTPATIENT)
Age: 39
End: 2024-12-13

## 2025-01-13 ENCOUNTER — APPOINTMENT (OUTPATIENT)
Dept: ENDOCRINOLOGY | Facility: CLINIC | Age: 40
End: 2025-01-13
Payer: COMMERCIAL

## 2025-01-13 VITALS
HEART RATE: 76 BPM | OXYGEN SATURATION: 97 % | SYSTOLIC BLOOD PRESSURE: 110 MMHG | BODY MASS INDEX: 27.83 KG/M2 | DIASTOLIC BLOOD PRESSURE: 82 MMHG | WEIGHT: 210 LBS | HEIGHT: 73 IN

## 2025-01-13 DIAGNOSIS — E78.5 HYPERLIPIDEMIA, UNSPECIFIED: ICD-10-CM

## 2025-01-13 DIAGNOSIS — R73.03 PREDIABETES.: ICD-10-CM

## 2025-01-13 DIAGNOSIS — E03.8 OTHER SPECIFIED HYPOTHYROIDISM: ICD-10-CM

## 2025-01-13 PROCEDURE — 99213 OFFICE O/P EST LOW 20 MIN: CPT

## 2025-05-12 NOTE — ASU DISCHARGE PLAN (ADULT/PEDIATRIC) - ACCOMPANIED BY
Please call patient - blood work was normal. Stool testing that I ordered is not covered by insurance.  If she is still having diarrhea, then she can complete the other stool testing that I ordered.     Spouse

## 2025-06-23 ENCOUNTER — RX RENEWAL (OUTPATIENT)
Age: 40
End: 2025-06-23

## 2025-07-03 ENCOUNTER — APPOINTMENT (OUTPATIENT)
Dept: ENDOCRINOLOGY | Facility: CLINIC | Age: 40
End: 2025-07-03
Payer: COMMERCIAL

## 2025-07-03 VITALS
DIASTOLIC BLOOD PRESSURE: 90 MMHG | BODY MASS INDEX: 28.89 KG/M2 | WEIGHT: 218 LBS | HEART RATE: 80 BPM | SYSTOLIC BLOOD PRESSURE: 130 MMHG | OXYGEN SATURATION: 98 % | HEIGHT: 73 IN

## 2025-07-03 PROCEDURE — 99213 OFFICE O/P EST LOW 20 MIN: CPT
